# Patient Record
Sex: FEMALE | Race: WHITE | NOT HISPANIC OR LATINO | Employment: OTHER | URBAN - METROPOLITAN AREA
[De-identification: names, ages, dates, MRNs, and addresses within clinical notes are randomized per-mention and may not be internally consistent; named-entity substitution may affect disease eponyms.]

---

## 2022-03-18 PROBLEM — E87.6 HYPOKALEMIA: Status: ACTIVE | Noted: 2020-04-22

## 2022-03-18 PROBLEM — Z86.19 HISTORY OF CRYPTOCOCCAL MENINGITIS: Status: ACTIVE | Noted: 2018-12-06

## 2022-03-18 PROBLEM — Z85.3 HISTORY OF BREAST CANCER: Status: ACTIVE | Noted: 2018-01-25

## 2022-03-19 PROBLEM — L98.9 LESION OF LOWER EXTREMITY: Status: ACTIVE | Noted: 2019-08-09

## 2022-03-19 PROBLEM — F32.0 MDD (MAJOR DEPRESSIVE DISORDER), SINGLE EPISODE, MILD (HCC): Status: ACTIVE | Noted: 2018-11-15

## 2022-03-19 PROBLEM — Z92.25 PERSONAL HISTORY OF IMMUNOSUPRESSION THERAPY: Status: ACTIVE | Noted: 2018-08-20

## 2022-03-19 PROBLEM — Z86.73 HISTORY OF LACUNAR CEREBROVASCULAR ACCIDENT: Status: ACTIVE | Noted: 2018-12-06

## 2022-03-20 PROBLEM — N25.81 SECONDARY HYPERPARATHYROIDISM OF RENAL ORIGIN (HCC): Status: ACTIVE | Noted: 2018-03-15

## 2022-03-20 PROBLEM — R55 SYNCOPE: Status: ACTIVE | Noted: 2020-04-22

## 2022-03-20 PROBLEM — I50.32 DIASTOLIC CHF, CHRONIC (HCC): Status: ACTIVE | Noted: 2018-09-10

## 2022-03-20 PROBLEM — I48.91 ATRIAL FIBRILLATION (HCC): Status: ACTIVE | Noted: 2018-08-31

## 2023-05-12 RX ORDER — LEVALBUTEROL TARTRATE 45 UG/1
AEROSOL, METERED ORAL
COMMUNITY
Start: 2019-03-04

## 2023-05-12 RX ORDER — DULOXETIN HYDROCHLORIDE 60 MG/1
60 CAPSULE, DELAYED RELEASE ORAL DAILY
COMMUNITY

## 2023-05-12 RX ORDER — ASCORBIC ACID 500 MG
TABLET ORAL 2 TIMES DAILY
COMMUNITY
Start: 2018-09-04

## 2023-05-12 RX ORDER — BACLOFEN 10 MG/1
TABLET ORAL PRN
COMMUNITY
Start: 2020-02-25

## 2023-05-12 RX ORDER — METRONIDAZOLE 10 MG/G
GEL TOPICAL
COMMUNITY
Start: 2019-10-07

## 2023-05-12 RX ORDER — PROPRANOLOL HYDROCHLORIDE 10 MG/1
TABLET ORAL 3 TIMES DAILY PRN
COMMUNITY
Start: 2019-12-17

## 2023-05-12 RX ORDER — CETIRIZINE HYDROCHLORIDE 10 MG/1
TABLET ORAL
COMMUNITY
Start: 2014-01-27

## 2023-05-12 RX ORDER — CYCLOSPORINE 0.5 MG/ML
EMULSION OPHTHALMIC
COMMUNITY
Start: 2019-10-25

## 2023-05-12 RX ORDER — ONDANSETRON 4 MG/1
TABLET, ORALLY DISINTEGRATING ORAL EVERY 8 HOURS PRN
COMMUNITY
Start: 2018-11-15

## 2023-05-12 RX ORDER — PRAVASTATIN SODIUM 80 MG/1
1 TABLET ORAL NIGHTLY
COMMUNITY
Start: 2019-10-03

## 2023-05-12 RX ORDER — OLMESARTAN MEDOXOMIL 20 MG/1
TABLET ORAL DAILY
COMMUNITY
Start: 2020-02-25

## 2023-05-12 RX ORDER — DIPHENOXYLATE HYDROCHLORIDE AND ATROPINE SULFATE 2.5; .025 MG/1; MG/1
1 TABLET ORAL
COMMUNITY
Start: 2020-04-24

## 2023-05-12 RX ORDER — METOPROLOL SUCCINATE 100 MG/1
1 TABLET, EXTENDED RELEASE ORAL DAILY
COMMUNITY
Start: 2019-11-20

## 2023-05-12 RX ORDER — ICOSAPENT ETHYL 500 MG/1
2 CAPSULE ORAL 2 TIMES DAILY
COMMUNITY
Start: 2020-01-30

## 2023-05-12 RX ORDER — FLUTICASONE PROPIONATE 50 MCG
SPRAY, SUSPENSION (ML) NASAL
COMMUNITY
Start: 2015-04-10

## 2023-05-12 RX ORDER — INSULIN LISPRO 100 [IU]/ML
INJECTION, SOLUTION INTRAVENOUS; SUBCUTANEOUS
COMMUNITY
Start: 2020-01-16

## 2023-05-12 RX ORDER — LEFLUNOMIDE 10 MG/1
10 TABLET ORAL DAILY
COMMUNITY

## 2023-05-12 RX ORDER — PANTOPRAZOLE SODIUM 40 MG/1
TABLET, DELAYED RELEASE ORAL DAILY
COMMUNITY
Start: 2020-04-29

## 2023-05-12 RX ORDER — INSULIN GLARGINE 100 [IU]/ML
INJECTION, SOLUTION SUBCUTANEOUS
COMMUNITY
Start: 2019-12-30

## 2023-05-12 RX ORDER — FUROSEMIDE 20 MG/1
1 TABLET ORAL DAILY
COMMUNITY
Start: 2019-11-27

## 2023-05-12 RX ORDER — MAGNESIUM CHLORIDE 64 MG
TABLET, DELAYED RELEASE (ENTERIC COATED) ORAL 2 TIMES DAILY
COMMUNITY
Start: 2018-12-27

## 2023-05-12 RX ORDER — FOLIC ACID 1 MG/1
TABLET ORAL DAILY
COMMUNITY
Start: 2014-01-27

## 2024-05-26 PROBLEM — M06.9 RHEUMATOID ARTHRITIS: Status: ACTIVE | Noted: 2024-05-26

## 2024-05-26 PROBLEM — M05.79 RHEUMATOID ARTHRITIS WITH RHEUMATOID FACTOR OF MULTIPLE SITES WITHOUT ORGAN OR SYSTEMS INVOLVEMENT: Status: ACTIVE | Noted: 2024-05-26

## 2024-05-28 ENCOUNTER — OFFICE VISIT (OUTPATIENT)
Age: 79
End: 2024-05-28
Payer: MEDICARE

## 2024-05-28 VITALS
TEMPERATURE: 97.8 F | SYSTOLIC BLOOD PRESSURE: 158 MMHG | WEIGHT: 158.5 LBS | BODY MASS INDEX: 31.95 KG/M2 | HEIGHT: 59 IN | DIASTOLIC BLOOD PRESSURE: 86 MMHG | HEART RATE: 71 BPM

## 2024-05-28 DIAGNOSIS — Z86.61 PERSONAL HISTORY OF MENINGITIS: ICD-10-CM

## 2024-05-28 DIAGNOSIS — M15.9 GENERALIZED OSTEOARTHROSIS, INVOLVING MULTIPLE SITES: ICD-10-CM

## 2024-05-28 DIAGNOSIS — M05.79 RHEUMATOID ARTHRITIS INVOLVING MULTIPLE SITES WITH POSITIVE RHEUMATOID FACTOR: Primary | ICD-10-CM

## 2024-05-28 DIAGNOSIS — D84.821 IMMUNOSUPPRESSION DUE TO DRUG THERAPY: ICD-10-CM

## 2024-05-28 DIAGNOSIS — K29.70 GASTRITIS WITHOUT BLEEDING, UNSPECIFIED CHRONICITY, UNSPECIFIED GASTRITIS TYPE: ICD-10-CM

## 2024-05-28 DIAGNOSIS — Z79.899 HIGH RISK MEDICATION USE: ICD-10-CM

## 2024-05-28 DIAGNOSIS — Z85.3 HISTORY OF BREAST CANCER: ICD-10-CM

## 2024-05-28 DIAGNOSIS — E10.9 TYPE 1 DIABETES MELLITUS WITHOUT COMPLICATION: ICD-10-CM

## 2024-05-28 DIAGNOSIS — Z79.899 IMMUNOSUPPRESSION DUE TO DRUG THERAPY: ICD-10-CM

## 2024-05-28 DIAGNOSIS — M85.89 OSTEOPENIA OF MULTIPLE SITES: ICD-10-CM

## 2024-05-28 PROCEDURE — 99214 OFFICE O/P EST MOD 30 MIN: CPT | Performed by: INTERNAL MEDICINE

## 2024-05-28 PROCEDURE — 1159F MED LIST DOCD IN RCRD: CPT | Performed by: INTERNAL MEDICINE

## 2024-05-28 PROCEDURE — G2211 COMPLEX E/M VISIT ADD ON: HCPCS | Performed by: INTERNAL MEDICINE

## 2024-05-28 PROCEDURE — 1160F RVW MEDS BY RX/DR IN RCRD: CPT | Performed by: INTERNAL MEDICINE

## 2024-05-28 RX ORDER — METOPROLOL SUCCINATE 100 MG/1
100 TABLET, EXTENDED RELEASE ORAL DAILY
COMMUNITY

## 2024-05-28 NOTE — PROGRESS NOTES
Office Follow Up      Date: 05/28/2024   Patient Name: Clarissa Gutierres V  MRN: [id number]  YOB: 1945    Referring Physician: Shameka Cagle     Chief Complaint:   Chief Complaint   Patient presents with    Rheumatoid Arthritis       History of Present Illness: Clarissa Gutierres V is a 78 y.o. female who is here today for follow up on rheumatoid arthritis.    She is tolerating leflunomide and methotrexate well and believes it is very helpful for her rheumatoid arthritis.    Rheumatoid is overall improved.  No joint swelling today.  Some aching but no swelling MCP hands and 5th toe.    Continues with intermittent knee pain. 9/2023 XR with mild degenerative changes.   She can't take NSAIDS due to anticoagulation and gastritis.   She has Voltaren gel she rarely uses. She continues on duloxetine which is helpful for joints.  Intolerant sulfasalazine due to rash/sulfa allergy so she stopped it.  No recent serious infections.  Hand films 9/2023 with mild to moderate OA. No erosions seen.    Patient had episode of meningitis August 2018 requiring hospitalization.  She was advised to stop  Xeljanz and never take them again by her infectious disease Dr. Chang and Dr Alexis Justice ([facility]).    She was hospitalized in [place] in late April 2023 due to gastritis and low hemoglobin 7. She had EGD with biopsy results showing gastritis.  No bleeding ulcer found.  She was placed on Protonix by her gastroenterologist Dr. Cortes.     Subjective       Review of Systems: Review of Systems   Constitutional:  Positive for fatigue. Negative for chills, fever and unexpected weight loss.   HENT:  Negative for mouth sores, sinus pressure and sore throat.         Dry mouth  Nose sores   Eyes:  Negative for pain and redness.        Dry eyes   Respiratory:  Negative for cough and shortness of breath.    Cardiovascular:  Negative for chest pain.   Gastrointestinal:   Positive for nausea and vomiting. Negative for abdominal pain, blood in stool, diarrhea and GERD.   Endocrine: Negative for polydipsia and polyuria.   Genitourinary:  Negative for dysuria, genital sores and hematuria.   Musculoskeletal:  Positive for arthralgias, neck pain and neck stiffness. Negative for back pain, joint swelling and myalgias.   Skin:  Negative for rash and bruise.        Psoriasis  Photosensitvity  Malar rash   Allergic/Immunologic: Negative for immunocompromised state.   Neurological:  Negative for seizures, weakness, numbness and memory problem.   Hematological:  Negative for adenopathy. Does not bruise/bleed easily.   Psychiatric/Behavioral:  Negative for depressed mood. The patient is not nervous/anxious.         Medications:   Current Outpatient Medications:     atorvastatin (LIPITOR) 40 MG tablet, Take 1 tablet by mouth Daily., Disp: , Rfl:     baclofen (LIORESAL) 10 MG tablet, Take 1 tablet by mouth 3 (Three) Times a Day., Disp: , Rfl:     Cholecalciferol (Vitamin D3) 50 MCG capsule, , Disp: , Rfl:     cycloSPORINE (RESTASIS) 0.05 % ophthalmic emulsion, , Disp: , Rfl:     Cymbalta 60 MG capsule, Take 1 capsule by mouth Daily., Disp: , Rfl:     DULoxetine (CYMBALTA) 30 MG capsule, Take 1 capsule by mouth Daily., Disp: , Rfl:     famotidine (PEPCID) 40 MG tablet, Take 1 tablet by mouth Every Night., Disp: , Rfl:     ferrous sulfate 325 (65 Fe) MG tablet, Take 1 tablet by mouth Daily., Disp: , Rfl:     fluticasone (FLONASE) 50 MCG/ACT nasal spray, 2 sprays by Each Nare route Daily. 50mcg, Disp: , Rfl:     folic acid (FOLVITE) 1 MG tablet, Take 1 tablet by mouth Daily., Disp: , Rfl:     furosemide (LASIX) 20 MG tablet, Take 1 tablet by mouth Daily., Disp: , Rfl:     insulin glargine (LANTUS, SEMGLEE) 100 UNIT/ML injection, Inject by subcutaneous route as per insulin protocol, Disp: , Rfl:     Insulin Lispro (humaLOG) 100 UNIT/ML injection, , Disp: , Rfl:     Insulin Syringe-Needle U-100 (BD  "Insulin Syringe U/F) 31G X 5/16\" 1 ML misc, Use., Disp: , Rfl:     ipratropium (ATROVENT) 0.03 % nasal spray, , Disp: , Rfl:     leflunomide (ARAVA) 20 MG tablet, Take 1 tablet by mouth Daily. For arthritis, Disp: , Rfl:     levalbuterol (XOPENEX HFA) 45 MCG/ACT inhaler, Inhale 2 puffs Every 4 (Four) Hours., Disp: , Rfl:     methotrexate 2.5 MG tablet, Take 3 tablets by mouth 1 (One) Time Per Week., Disp: , Rfl:     metoprolol succinate XL (TOPROL-XL) 100 MG 24 hr tablet, Take 1 tablet by mouth Daily., Disp: , Rfl:     multivitamin (MULTIPLE VITAMINS PO), Take 1 tablet by mouth Daily., Disp: , Rfl:     olmesartan (BENICAR) 20 MG tablet, Take 1 tablet by mouth Daily., Disp: , Rfl:     pantoprazole (PROTONIX) 40 MG EC tablet, Take 1 tablet by mouth Daily., Disp: , Rfl:     potassium chloride (KLOR-CON M20) 20 MEQ CR tablet, Take 1 tablet by mouth. Three times weekly, Disp: , Rfl:     promethazine (PHENERGAN) 25 MG tablet, Take 1 tablet by mouth Every 4 (Four) to 6 (Six) Hours As Needed for Nausea or Vomiting., Disp: , Rfl:     rivaroxaban (XARELTO) 15 MG tablet, , Disp: , Rfl:     Allergies:   Allergies   Allergen Reactions    Sulfa Antibiotics Rash       I have reviewed and updated the patient's chief complaint, history of present illness, review of systems, past medical history, surgical history, family history, social history, medications and allergy list as appropriate.     Objective        Vital Signs:   Vitals:    05/28/24 1122   BP: 158/86   BP Location: Left arm   Patient Position: Sitting   Cuff Size: Adult   Pulse: 71   Temp: 97.8 °F (36.6 °C)   Weight: 71.9 kg (158 lb 8 oz)   Height: 149.9 cm (59\")   PainSc:   6     Body mass index is 32.01 kg/m².      Physical Exam:  Physical Exam   MUSCULOSKELETAL:   No peripheral synovitis  Tender bilateral fifth MCP joint hand without swelling  Tender fifth MTP joint left foot without swelling    Complete joint exam was performed including the MCPs, PIPs, DIPs of the " "hands, wrists, elbows, shoulders, hips, knees and ankles.  No soft tissue swelling or tenderness is present except as above.    General: The patient is well-developed and well nourished. Cooperative, alert and oriented. Affect is normal. Hydration appears normal.   HEENT: Normocephalic and atraumatic. No notable alopecia. Lids and conjunctiva are normal. Pupils are equal and sclera are clear. Oropharynx is clear   NECK neck is supple without adenopathy, masses or thyromegaly.   CARDIOVASCULAR: Regular rate and rhythm. No murmurs, rubs or gallops   LUNGS: Effort is normal. Lungs are clear bilateral   ABDOMEN: Not examined  EXTREMITIES: Peripheral pulses are intact. No clubbing.   SKIN: No rashes. No subcutaneous nodules. No digital ulcers. No sclerodactyly.   NEUROLOGIC: Gait is normal. Strength testing is normal.  No focal neurologic deficits    Results Review:   Labs:   Lab Results   Component Value Date    BUN 26 05/23/2024    CREATININE 1.5 (H) 05/23/2024    BCR 17 05/23/2024    K 3.8 05/23/2024    CO2 23 05/23/2024    CALCIUM 9.0 05/23/2024    ALBUMIN 3.9 05/23/2024    BILITOT 1.0 05/23/2024    AST 20 05/23/2024    ALT 19 05/23/2024     Lab Results   Component Value Date    WBC 6.1 05/23/2024    HGB 10.8 (L) 05/23/2024    HCT 33.2 05/23/2024    MCV 88.6 05/23/2024     05/23/2024     Lab Results   Component Value Date    SEDRATE 55 (H) 12/04/2019     Lab Results   Component Value Date    CRP <0.29 12/04/2019     No results found for: \"QUANTIFERO\", \"QUANTITB1\", \"QUANTITB2\", \"QUANTIFERN\", \"QUANTIFERM\", \"QUANTITBGLDP\"  No results found for: \"RF\"  No results found for: \"HEPBSAG\", \"HEPAIGM\", \"HEPBIGMCORE\", \"HEPCVIRUSABY\"      Procedures    Assessment / Plan        Assessment & Plan  Rheumatoid arthritis involving multiple sites with positive rheumatoid factor  dx 2009 dr morales; +; 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  Rash with sulfasalazine/sulfa allergic  prior plaquenil [id number] (stopped [facility] 8.15)  prior xeljanz 11.15 -stopped with meningitis August 2018  Told by ID never to take biologics again  Hand films 9/2023 with mild-moderate OA and no erosions seen.      Prior Methotrexate and Xeljanz stopped in August 2018 due to episode of meningitis requiring hospitalization.    Her ID doctor recommend she never take biologics/JAKs again  Intolerant sulfasalazine due to rash/sulfa allergy. Remain off sulfasalazine.    Some hand pain likely related to OA- hand films 9/2023 with mild-moderate OA and no erosions seen  Avoids oral NSAIDs with anticoagulation.  Continue duloxetine, topical diclofenac for OA pain    Low disease activity from RA.  Swollen joint count 0 tender joint count 2. Good prognosis  Continue methotrexate and leflunomide.   Will avoid further Biologics/JAKis for RA given her history of meningitis August 2018  Continue labs CBC CMP every 8 weeks for monitoring. Standing order provided.    Labs reviewed and stable   rtc 4 months  Immunosuppression due to drug therapy  Methotrexate and leflunomide    Well tolerated and effective  We have discussed the side effects of leflunomide including but not limited to rash, GI upset, hematologic and liver abnormalities     We discussed DMARD therapy including Plaquenil, Sulfasalazine,  Leflunomide, and MTX and the alternative of not being treated. MTX was chosen. Risks include but are not limited to severe liver damage that can be fatal, the possible need for liver biopsy, bone marrow suppression that can lead to dangerously low blood counts, GI side effects including mouth sores and diarrhea, fatigue, and rare risk of severe pulmonary complications. There should be no alcohol consumed with MTX. MTX can cause severe fetal abnormalities whether the mother or father is taking the medication and thus  must be avoided if pregnancy is a possibility.  All medication is to be taken one day a week only. The need for q 8-12 week labs and the need for folic acid supplementation were discussed  High risk medication use    Generalized osteoarthrosis, involving multiple sites  2019 XRs severe PF OA  2023 XRs with mild degenerative change.    Avoids oral NSAIDs with anticoagulation  Some OA pain hands and feet today  Can schedule injections PRN.   She wants to try Voltaren gel more regularly first  Recommend increase duloxetine to 30 mg in AM and 60 mg in PM  Osteopenia of multiple sites  DEXA performed 1/18/2018 : stable mild osteopenia T score -1.1  DXA with PCP 2020  Prior Fosamax  History of radiation for breast cancer    She states her PCP completed a DEXA in 2020 because she couldn't come to [place] for her testing in our office due to COVID.   Continue calcium vitamin D and weightbearing exercise  Now following with PCP    Personal history of meningitis  August 2018 requiring hospitalization.   Previous Infectious disease in [facility] Dr. Alexis Vela    History of 2018. She's recovered well from this.  No recurrence  Still some memory deficits but overall doing much better    Type 1 diabetes mellitus without complication    History of breast cancer  hx of; sp radiation 2001.  Gastritis without bleeding, unspecified chronicity, unspecified gastritis type  GI in [place] Dr. Cortes    Orders Placed This Encounter   Procedures    BUN+Creat ([facility])    CBC Auto Differential    C-reactive Protein    Hepatic Function Panel    Sedimentation Rate              Follow Up:   Return in about 4 months (around 9/28/2024).        Kamlesh Washburn MD  [facility] [place]

## 2024-05-28 NOTE — ASSESSMENT & PLAN NOTE
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  Rash with sulfasalazine/sulfa allergic  prior plaquenil [id number] ([facility]  prior xeljanz 11.15 -stopped with meningitis August 2018  Told by ID never to take biologics again  Hand films 9/2023 with mild-moderate OA and no erosions seen.      Prior Methotrexate and Xeljanz stopped in August 2018 due to episode of meningitis requiring hospitalization.    Her ID doctor recommend she never take biologics/JAKs again  Intolerant sulfasalazine due to rash/sulfa allergy. Remain off sulfasalazine.    Some hand pain likely related to OA- hand films 9/2023 with mild-moderate OA and no erosions seen  Avoids oral NSAIDs with anticoagulation.  Continue duloxetine, topical diclofenac for OA pain    Low disease activity from RA.  Swollen joint count 0 tender joint count 2. Good prognosis  Continue methotrexate and leflunomide.   Will avoid further Biologics/JAKis for RA given her history of meningitis August 2018  Continue labs CBC CMP every 8 weeks for monitoring. Standing order provided.    Labs reviewed and stable   rtc 4 months

## 2024-05-28 NOTE — ASSESSMENT & PLAN NOTE
Methotrexate and leflunomide    Well tolerated and effective  We have discussed the side effects of leflunomide including but not limited to rash, GI upset, hematologic and liver abnormalities     We discussed DMARD therapy including Plaquenil, Sulfasalazine,  Leflunomide, and MTX and the alternative of not being treated. MTX was chosen. Risks include but are not limited to severe liver damage that can be fatal, the possible need for liver biopsy, bone marrow suppression that can lead to dangerously low blood counts, GI side effects including mouth sores and diarrhea, fatigue, and rare risk of severe pulmonary complications. There should be no alcohol consumed with MTX. MTX can cause severe fetal abnormalities whether the mother or father is taking the medication and thus must be avoided if pregnancy is a possibility.  All medication is to be taken one day a week only. The need for q 8-12 week labs and the need for folic acid supplementation were discussed

## 2024-06-21 RX ORDER — METHOTREXATE 2.5 MG/1
TABLET ORAL
Qty: 36 TABLET | Refills: 3 | Status: SHIPPED | OUTPATIENT
Start: 2024-06-21

## 2024-07-05 RX ORDER — LEFLUNOMIDE 20 MG/1
TABLET ORAL
Qty: 90 TABLET | Refills: 0 | Status: SHIPPED | OUTPATIENT
Start: 2024-07-05

## 2024-09-05 RX ORDER — DULOXETIN HYDROCHLORIDE 30 MG/1
CAPSULE, DELAYED RELEASE ORAL
Qty: 90 CAPSULE | Refills: 1 | Status: SHIPPED | OUTPATIENT
Start: 2024-09-05

## 2024-10-02 RX ORDER — LEFLUNOMIDE 20 MG/1
TABLET ORAL
Qty: 90 TABLET | Refills: 0 | Status: SHIPPED | OUTPATIENT
Start: 2024-10-02 | End: 2024-10-03

## 2024-10-03 ENCOUNTER — OFFICE VISIT (OUTPATIENT)
Age: 79
End: 2024-10-03
Payer: MEDICARE

## 2024-10-03 ENCOUNTER — TELEPHONE (OUTPATIENT)
Age: 79
End: 2024-10-03

## 2024-10-03 VITALS
BODY MASS INDEX: 32.66 KG/M2 | TEMPERATURE: 97.7 F | SYSTOLIC BLOOD PRESSURE: 152 MMHG | DIASTOLIC BLOOD PRESSURE: 88 MMHG | WEIGHT: 162 LBS | HEART RATE: 61 BPM | HEIGHT: 59 IN

## 2024-10-03 DIAGNOSIS — M05.79 RHEUMATOID ARTHRITIS INVOLVING MULTIPLE SITES WITH POSITIVE RHEUMATOID FACTOR: Primary | ICD-10-CM

## 2024-10-03 DIAGNOSIS — Z79.899 HIGH RISK MEDICATION USE: ICD-10-CM

## 2024-10-03 RX ORDER — METHOTREXATE 2.5 MG/1
12.5 TABLET ORAL WEEKLY
Qty: 60 TABLET | Refills: 0 | Status: SHIPPED | OUTPATIENT
Start: 2024-10-03

## 2024-10-03 RX ORDER — DULOXETINE HCL 60 MG
60 CAPSULE,DELAYED RELEASE (ENTERIC COATED) ORAL DAILY
Qty: 90 CAPSULE | Refills: 1 | Status: SHIPPED | OUTPATIENT
Start: 2024-10-03

## 2024-10-03 RX ORDER — ALBUTEROL SULFATE 90 UG/1
INHALANT RESPIRATORY (INHALATION)
COMMUNITY
Start: 2024-09-04

## 2024-10-03 RX ORDER — FOLIC ACID 1 MG/1
1 TABLET ORAL DAILY
Qty: 90 TABLET | Refills: 3 | Status: SHIPPED | OUTPATIENT
Start: 2024-10-03

## 2024-10-03 RX ORDER — DULOXETIN HYDROCHLORIDE 30 MG/1
30 CAPSULE, DELAYED RELEASE ORAL DAILY
Qty: 90 CAPSULE | Refills: 1 | Status: SHIPPED | OUTPATIENT
Start: 2024-10-03

## 2024-10-03 RX ORDER — LEFLUNOMIDE 20 MG/1
20 TABLET ORAL DAILY
Qty: 90 TABLET | Refills: 0 | Status: SHIPPED | OUTPATIENT
Start: 2024-10-03

## 2024-10-03 NOTE — PROGRESS NOTES
Office Follow Up      Date: 10/03/2024   Patient Name: Clarissa Gutierres V  MRN: [id number]  YOB: 1945    Referring Physician: No ref. provider found     Chief Complaint: RA  Chief Complaint   Patient presents with    Follow-up       History of Present Illness: Clarissa Gutierres V is a 78 y.o. female who is here today for follow up on rheumatoid arthritis.    She is tolerating leflunomide and methotrexate well and believes they are helpful for her rheumatoid arthritis.    Rheumatoid is overall improved.  No joint swelling today.  Some aching hands PIPs/DIP joints and feet MTP joint but no swelling     Continues with intermittent knee pain. 9/2023 XR with mild degenerative changes.   She avoids oral NSAIDS due to anticoagulation and gastritis.   She has Voltaren gel she rarely uses. She continues on duloxetine which is helpful for pain  Intolerant sulfasalazine due to rash/sulfa allergy so she stopped it.  No recent serious infections.  Hand films 9/2023 with mild to moderate OA. No erosions seen.    Patient had episode of meningitis August 2018 requiring hospitalization.  She was advised to stop    Xeljanz and never take José Antonio inhibitor again by her infectious disease Dr. Chang and Dr Alexis Justice ([facility] in [place])    She was hospitalized in [place] in late April 2023 due to gastritis and low hemoglobin 7. She had EGD with biopsy results showing gastritis.  No bleeding ulcer found.  She was placed on Protonix by her gastroenterologist Dr. Cortes.       Subjective       Review of Systems: Review of Systems   Constitutional:  Positive for fatigue. Negative for chills, fever and unexpected weight loss.   HENT:  Negative for mouth sores, sinus pressure and sore throat.         Dry mouth  Nose sores   Eyes:  Negative for pain and redness.        Dry eyes   Respiratory:  Negative for cough and shortness of breath.    Cardiovascular:  Positive for chest pain.    Gastrointestinal:  Positive for nausea and vomiting. Negative for abdominal pain, blood in stool, diarrhea and GERD.   Endocrine: Negative for polydipsia and polyuria.   Genitourinary:  Negative for dysuria, genital sores and hematuria.   Musculoskeletal:  Positive for arthralgias, neck pain and neck stiffness. Negative for back pain, joint swelling and myalgias.   Skin:  Negative for rash and bruise.        Psoriasis  Photosensitvity  Malar rash   Allergic/Immunologic: Negative for immunocompromised state.   Neurological:  Negative for seizures, weakness, numbness and memory problem.   Hematological:  Negative for adenopathy. Does not bruise/bleed easily.   Psychiatric/Behavioral:  Negative for depressed mood. The patient is not nervous/anxious.         Medications:   Current Outpatient Medications:     albuterol sulfate  (90 Base) MCG/ACT inhaler, , Disp: , Rfl:     atorvastatin (LIPITOR) 40 MG tablet, Take 1 tablet by mouth Daily., Disp: , Rfl:     baclofen (LIORESAL) 10 MG tablet, Take 1 tablet by mouth 3 (Three) Times a Day., Disp: , Rfl:     Cholecalciferol (Vitamin D3) 50 MCG capsule, , Disp: , Rfl:     cycloSPORINE (RESTASIS) 0.05 % ophthalmic emulsion, , Disp: , Rfl:     Cymbalta 60 MG capsule, Take 1 capsule by mouth Daily., Disp: 90 capsule, Rfl: 1    DULoxetine (CYMBALTA) 30 MG capsule, Take 1 capsule by mouth Daily., Disp: 90 capsule, Rfl: 1    famotidine (PEPCID) 40 MG tablet, Take 1 tablet by mouth Every Night., Disp: , Rfl:     ferrous sulfate 325 (65 Fe) MG tablet, Take 1 tablet by mouth Daily., Disp: , Rfl:     fluticasone (FLONASE) 50 MCG/ACT nasal spray, 2 sprays by Each Nare route Daily. 50mcg, Disp: , Rfl:     folic acid (FOLVITE) 1 MG tablet, Take 1 tablet by mouth Daily., Disp: 90 tablet, Rfl: 3    furosemide (LASIX) 20 MG tablet, Take 1 tablet by mouth Daily., Disp: , Rfl:     insulin glargine (LANTUS, SEMGLEE) 100 UNIT/ML injection, Inject by subcutaneous route as per insulin  "protocol, Disp: , Rfl:     Insulin Lispro (humaLOG) 100 UNIT/ML injection, , Disp: , Rfl:     Insulin Syringe-Needle U-100 (BD Insulin Syringe U/F) 31G X 5/16\" 1 ML misc, Use., Disp: , Rfl:     ipratropium (ATROVENT) 0.03 % nasal spray, , Disp: , Rfl:     leflunomide (ARAVA) 20 MG tablet, Take 1 tablet by mouth Daily., Disp: 90 tablet, Rfl: 0    levalbuterol (XOPENEX HFA) 45 MCG/ACT inhaler, Inhale 2 puffs Every 4 (Four) Hours., Disp: , Rfl:     methotrexate 2.5 MG tablet, Take 5 tablets by mouth 1 (One) Time Per Week., Disp: 60 tablet, Rfl: 0    metoprolol succinate XL (TOPROL-XL) 100 MG 24 hr tablet, Take 1 tablet by mouth Daily., Disp: , Rfl:     multivitamin (MULTIPLE VITAMINS PO), Take 1 tablet by mouth Daily., Disp: , Rfl:     olmesartan (BENICAR) 20 MG tablet, Take 1 tablet by mouth Daily., Disp: , Rfl:     potassium chloride (KLOR-CON M20) 20 MEQ CR tablet, Take 1 tablet by mouth. Three times weekly, Disp: , Rfl:     rivaroxaban (XARELTO) 15 MG tablet, , Disp: , Rfl:     Allergies:   Allergies   Allergen Reactions    Sulfa Antibiotics Rash       I have reviewed and updated the patient's chief complaint, history of present illness, review of systems, past medical history, surgical history, family history, social history, medications and allergy list as appropriate.     Objective        Vital Signs:   Vitals:    10/03/24 1021   BP: 152/88   BP Location: Left arm   Patient Position: Sitting   Cuff Size: Adult   Pulse: 61   Temp: 97.7 °F (36.5 °C)   Weight: 73.5 kg (162 lb)   Height: 149.9 cm (59\")   PainSc:   4   PainLoc: Generalized       Body mass index is 32.72 kg/m².      Physical Exam:  Physical Exam   MUSCULOSKELETAL:   No peripheral synovitis  Tegan's nodes present throughout the hands.  Scattered tenderness DIP joints  Tender MTP joints bilateral feet    Complete joint exam was performed including the MCPs, PIPs, DIPs of the hands, wrists, elbows, shoulders, hips, knees and ankles.  No soft tissue " "swelling or tenderness is present except as above.    General: The patient is well-developed and well nourished. Cooperative, alert and oriented. Affect is normal. Hydration appears normal.   HEENT: Normocephalic and atraumatic. No notable alopecia. Lids and conjunctiva are normal. Pupils are equal and sclera are clear. Oropharynx is clear   NECK neck is supple without adenopathy, masses or thyromegaly.   CARDIOVASCULAR: Regular rate and rhythm. No murmurs, rubs or gallops   LUNGS: Effort is normal. Lungs are clear bilateral   ABDOMEN: Not examined  EXTREMITIES: Peripheral pulses are intact. No clubbing.   SKIN: No rashes. No subcutaneous nodules. No digital ulcers. No sclerodactyly.   NEUROLOGIC: Gait is normal. Strength testing is normal.  No focal neurologic deficits    Results Review:   Labs:   Lab Results   Component Value Date    BUN 21 09/26/2024    BUN 21 09/26/2024    CREATININE 1.2 (H) 09/26/2024    CREATININE 1.2 (H) 09/26/2024    BCR 18 09/26/2024    K 4.6 09/26/2024    CO2 25 09/26/2024    CALCIUM 8.3 (L) 09/26/2024    ALBUMIN 3.8 09/26/2024    ALBUMIN 3.7 09/26/2024    BILITOT 0.7 09/26/2024    BILITOT 0.7 09/26/2024    AST 25 09/26/2024    AST 24 09/26/2024    ALT 24 09/26/2024    ALT 23 09/26/2024     Lab Results   Component Value Date    WBC 5.3 09/26/2024    WBC 5.2 09/26/2024    HGB 11.3 (L) 09/26/2024    HGB 10.8 (L) 09/26/2024    HCT 34.1 09/26/2024    HCT 34.0 09/26/2024    MCV 87.1 09/26/2024    MCV 87.0 09/26/2024     09/26/2024     09/26/2024     Lab Results   Component Value Date    SEDRATE 55 (H) 12/04/2019     Lab Results   Component Value Date    CRP <0.29 12/04/2019     No results found for: \"QUANTIFERO\", \"QUANTITB1\", \"QUANTITB2\", \"QUANTIFERN\", \"QUANTIFERM\", \"QUANTITBGLDP\"  No results found for: \"RF\"  No results found for: \"HEPBSAG\", \"HEPAIGM\", \"HEPBIGMCORE\", \"HEPCVIRUSABY\"      Procedures    Assessment / Plan        Rheumatoid arthritis involving multiple sites with " positive rheumatoid factor  dx 2009 dr morales; +; 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  Rash with sulfasalazine/sulfa allergic  prior plaquenil [id number] (stopped [facility] 8.15)  prior xeljanz 11.15 -stopped with meningitis August 2018  Told by ID never to take biologics again  Hand films 9/2023 with mild-moderate OA and no erosions seen.        Prior Methotrexate and Xeljanz stopped in August 2018 due to episode of meningitis requiring hospitalization.    Her ID doctor recommend she never take biologics/JAKs again  Intolerant sulfasalazine due to rash/sulfa allergy. Remain off sulfasalazine.     Some hand pain likely related to OA- hand films 9/2023 with mild-moderate OA and no erosions seen  Avoids oral NSAIDs with anticoagulation.    Continue duloxetine 90mg, topical diclofenac for OA pain     Low disease activity from RA.  Swollen joint count 0 tender joint count 2. Good prognosis  Increased aching in the hands and MTP joints of the feet could be smoldering RA.  -Update hand and feet x-rays today  Increase methotrexate to 12.5 mg qwk and continue leflunomide 20mg qd.   Will avoid further Biologics/JAKis for RA given her history of meningitis August 2018  Continue labs CBC CMP every 8 weeks for monitoring. Standing order provided.    Labs 9/26/2024 reviewed and stable   rtc 4 months    Immunosuppression due to drug therapy  Methotrexate and leflunomide     Well tolerated and effective  We have discussed the side effects of leflunomide including but not limited to rash, GI upset, hematologic and liver abnormalities     Risks of methotrexate include but are not limited to severe liver damage that can be fatal, the possible need for liver biopsy, bone marrow suppression that can lead to dangerously low blood counts, GI side effects including  mouth sores and diarrhea, fatigue, and rare risk of severe pulmonary complications. There should be no alcohol consumed with MTX. MTX can cause severe fetal abnormalities whether the mother or father is taking the medication and thus must be avoided if pregnancy is a possibility.  All medication is to be taken one day a week only. The need for q 8-12 week labs and the need for folic acid supplementation were discussed    High risk medication use     Generalized osteoarthrosis, involving multiple sites  2019 XRs severe PF OA  2023 XRs with mild degenerative change.     Avoids oral NSAIDs with anticoagulation/history of GI bleed  Some OA pain hands and feet today  Can schedule injections PRN.   She wants to try diclofenac gel more regularly first  Recommend duloxetine to 30 mg in AM and 60 mg in PM    Osteopenia of multiple sites  DEXA performed 1/18/2018 : stable mild osteopenia T score -1.1  DXA with PCP 2020  Prior Fosamax  History of radiation for breast cancer     She states her PCP completed a DEXA in 2020 because she couldn't come to [place] for her testing in our office due to COVID.   Continue calcium vitamin D and weightbearing exercise  Now following DEXA scan with PCP     Personal history of meningitis  August 2018 requiring hospitalization.   Previous Infectious disease in [facility] Dr. Alexis Vela     History of 2018. She's recovered well from this.  No recurrence  Still some memory deficits but overall doing much better     diabetes mellitus      History of breast cancer  hx of; sp radiation 2001.    Gastritis without bleeding, unspecified chronicity, unspecified gastritis type  GI in [place] Dr. Cortes        Assessment & Plan  Rheumatoid arthritis involving multiple sites with positive rheumatoid factor    High risk medication use      Orders Placed This Encounter   Procedures    XR Hand 2 View Bilateral    XR Foot 3+ View Bilateral    Comprehensive Metabolic Panel    CBC Auto  Differential    C-reactive Protein    Sedimentation Rate       New Medications Ordered This Visit   Medications    DULoxetine (CYMBALTA) 30 MG capsule     Sig: Take 1 capsule by mouth Daily.     Dispense:  90 capsule     Refill:  1    Cymbalta 60 MG capsule     Sig: Take 1 capsule by mouth Daily.     Dispense:  90 capsule     Refill:  1    leflunomide (ARAVA) 20 MG tablet     Sig: Take 1 tablet by mouth Daily.     Dispense:  90 tablet     Refill:  0    methotrexate 2.5 MG tablet     Sig: Take 5 tablets by mouth 1 (One) Time Per Week.     Dispense:  60 tablet     Refill:  0    folic acid (FOLVITE) 1 MG tablet     Sig: Take 1 tablet by mouth Daily.     Dispense:  90 tablet     Refill:  3           Follow Up:   Return in about 4 months (around 2/3/2025).        Kamlesh Washburn MD  [facility]

## 2024-10-08 ENCOUNTER — TELEPHONE (OUTPATIENT)
Age: 79
End: 2024-10-08
Payer: MEDICARE

## 2024-10-08 NOTE — TELEPHONE ENCOUNTER
[facility] called regarding rx for Cymbalta 60 mg. They want to know if it is ok to fill generic duloxetine as brand name is not covered by plan. Advised that rx was sent in [place] by mistake and generic ok to fill.

## 2024-11-19 ENCOUNTER — TELEPHONE (OUTPATIENT)
Age: 79
End: 2024-11-19
Payer: MEDICARE

## 2024-11-19 NOTE — TELEPHONE ENCOUNTER
[facility] called requesting a call back re: pt's Duloxetine.  The number to call back is [phone] and the invoice # to give them when you call is [id number]. -GRAHAM William

## 2024-11-20 NOTE — TELEPHONE ENCOUNTER
Pharmacy states that RX is due to be shipped. Unable to find an issue but will call us back if something comes up

## 2025-01-03 DIAGNOSIS — M05.79 RHEUMATOID ARTHRITIS INVOLVING MULTIPLE SITES WITH POSITIVE RHEUMATOID FACTOR: ICD-10-CM

## 2025-01-07 RX ORDER — METHOTREXATE 2.5 MG/1
TABLET ORAL
Qty: 60 TABLET | Refills: 0 | Status: SHIPPED | OUTPATIENT
Start: 2025-01-07

## 2025-02-07 ENCOUNTER — OFFICE VISIT (OUTPATIENT)
Age: 80
End: 2025-02-07
Payer: MEDICARE

## 2025-02-07 VITALS
HEART RATE: 58 BPM | HEIGHT: 59 IN | BODY MASS INDEX: 32.78 KG/M2 | SYSTOLIC BLOOD PRESSURE: 124 MMHG | TEMPERATURE: 97.3 F | DIASTOLIC BLOOD PRESSURE: 58 MMHG | WEIGHT: 162.6 LBS

## 2025-02-07 DIAGNOSIS — Z79.899 HIGH RISK MEDICATION USE: ICD-10-CM

## 2025-02-07 DIAGNOSIS — M05.79 RHEUMATOID ARTHRITIS INVOLVING MULTIPLE SITES WITH POSITIVE RHEUMATOID FACTOR: Primary | ICD-10-CM

## 2025-02-07 RX ORDER — ALENDRONATE SODIUM 35 MG/1
35 TABLET ORAL
Qty: 12 TABLET | Refills: 3 | Status: SHIPPED | OUTPATIENT
Start: 2025-02-07

## 2025-02-07 RX ORDER — METHOTREXATE 2.5 MG/1
12.5 TABLET ORAL WEEKLY
Qty: 60 TABLET | Refills: 0 | Status: SHIPPED | OUTPATIENT
Start: 2025-02-07

## 2025-02-07 RX ORDER — LEFLUNOMIDE 20 MG/1
20 TABLET ORAL DAILY
Qty: 90 TABLET | Refills: 0 | Status: SHIPPED | OUTPATIENT
Start: 2025-02-07

## 2025-02-07 NOTE — PROGRESS NOTES
Office Follow Up      Date: 02/07/2025   Patient Name: Clarissa Gutierres V  MRN: [id number]  YOB: 1945    Referring Physician: No ref. provider found     Chief Complaint:   Chief Complaint   Patient presents with    Rheumatoid Arthritis       History of Present Illness: Clarissa Gutierres V is a 79 y.o. female who is here today for follow up on rheumatoid arthritis.     She is tolerating leflunomide and methotrexate well    Rheumatoid is overall improved.  No joint swelling today.    Some aching hands PIPs/DIP joints and feet MTP joints from OA but no swelling      Continues with intermittent knee pain. 9/2023 XR with mild degenerative changes.   She avoids oral NSAIDS due to anticoagulation and gastritis and renal insufficiency.   She has Voltaren gel she rarely uses. She continues on duloxetine which is helpful for pain  Previously intolerant to sulfasalazine due to rash/sulfa allergy   No recent serious infections.  Hand films 9/2023 with mild to moderate OA. No erosions seen.     Patient had episode of meningitis August 2018 requiring hospitalization.  She was advised to stop    Xeljanz and never take José Antonio inhibitor again by her infectious disease Dr. Chang and Dr Alexis Justice ([facility] in [place])     She was hospitalized in [place] in late April 2023 due to gastritis and low hemoglobin 7. She had EGD with biopsy results showing gastritis.  No bleeding ulcer found.  She was placed on Protonix by her gastroenterologist Dr. Cortes.       Subjective       Review of Systems: Review of Systems   Constitutional:  Negative for chills, fatigue, fever and unexpected weight loss.   HENT:  Negative for mouth sores, sinus pressure and sore throat.    Eyes:  Negative for pain and redness.   Respiratory:  Negative for cough and shortness of breath.    Cardiovascular:  Negative for chest pain.   Gastrointestinal:  Negative for abdominal pain, blood in stool, diarrhea, nausea,  vomiting and GERD.   Endocrine: Negative for polydipsia and polyuria.   Genitourinary:  Negative for dysuria, genital sores and hematuria.   Musculoskeletal:  Negative for arthralgias, back pain, joint swelling, myalgias, neck pain and neck stiffness.   Skin:  Negative for rash and bruise.   Neurological:  Negative for seizures, weakness, numbness and memory problem.   Hematological:  Negative for adenopathy. Does not bruise/bleed easily.   Psychiatric/Behavioral:  Negative for depressed mood. The patient is not nervous/anxious.         Past Medical History: History reviewed. No pertinent past medical history.    Past Surgical History: History reviewed. No pertinent surgical history.    Family History: History reviewed. No pertinent family history.    Social History:   Social History     Socioeconomic History    Marital status:    Tobacco Use    Smoking status: Never    Smokeless tobacco: Never   Vaping Use    Vaping status: Never Used   Substance and Sexual Activity    Alcohol use: Never    Drug use: Never    Sexual activity: Defer       Medications:   Current Outpatient Medications:     albuterol sulfate  (90 Base) MCG/ACT inhaler, , Disp: , Rfl:     atorvastatin (LIPITOR) 40 MG tablet, Take 1 tablet by mouth Daily., Disp: , Rfl:     baclofen (LIORESAL) 10 MG tablet, Take 1 tablet by mouth 3 (Three) Times a Day., Disp: , Rfl:     cycloSPORINE (RESTASIS) 0.05 % ophthalmic emulsion, , Disp: , Rfl:     Cymbalta 60 MG capsule, Take 1 capsule by mouth Daily., Disp: 90 capsule, Rfl: 1    fluticasone (FLONASE) 50 MCG/ACT nasal spray, 2 sprays by Each Nare route Daily. 50mcg, Disp: , Rfl:     folic acid (FOLVITE) 1 MG tablet, Take 1 tablet by mouth Daily., Disp: 90 tablet, Rfl: 3    furosemide (LASIX) 20 MG tablet, Take 1 tablet by mouth Daily., Disp: , Rfl:     insulin glargine (LANTUS, SEMGLEE) 100 UNIT/ML injection, Inject by subcutaneous route as per insulin protocol, Disp: , Rfl:     Insulin Lispro  "(humaLOG) 100 UNIT/ML injection, , Disp: , Rfl:     Insulin Syringe-Needle U-100 (BD Insulin Syringe U/F) 31G X 5/16\" 1 ML misc, Use., Disp: , Rfl:     ipratropium (ATROVENT) 0.03 % nasal spray, , Disp: , Rfl:     leflunomide (ARAVA) 20 MG tablet, Take 1 tablet by mouth Daily., Disp: 90 tablet, Rfl: 0    levalbuterol (XOPENEX HFA) 45 MCG/ACT inhaler, Inhale 2 puffs Every 4 (Four) Hours., Disp: , Rfl:     methotrexate 2.5 MG tablet, Take 5 tablets by mouth 1 (One) Time Per Week., Disp: 60 tablet, Rfl: 0    metoprolol succinate XL (TOPROL-XL) 100 MG 24 hr tablet, Take 1 tablet by mouth Daily., Disp: , Rfl:     olmesartan (BENICAR) 20 MG tablet, Take 1 tablet by mouth Daily., Disp: , Rfl:     rivaroxaban (XARELTO) 15 MG tablet, , Disp: , Rfl:     alendronate (FOSAMAX) 35 MG tablet, Take 1 tablet by mouth Every 7 (Seven) Days., Disp: 12 tablet, Rfl: 3    Cholecalciferol (Vitamin D3) 50 MCG capsule, , Disp: , Rfl:     Allergies:   Allergies   Allergen Reactions    Sulfa Antibiotics Rash           Objective        Vital Signs:   Vitals:    02/07/25 1006   BP: 124/58   Pulse: 58   Temp: 97.3 °F (36.3 °C)   Weight: 73.8 kg (162 lb 9.6 oz)   Height: 149.9 cm (59\")   PainSc:   3   PainLoc: Generalized     Body mass index is 32.84 kg/m².      Physical Exam:  Physical Exam   MUSCULOSKELETAL:   No peripheral synovitis  Heberden and Tegan's nodes present throughout the hands    Complete joint exam was performed including the MCPs, PIPs, DIPs of the hands, wrists, elbows, shoulders, hips, knees and ankles.  No soft tissue swelling or tenderness is present except as above.    General: The patient is well-developed and well nourished. Cooperative, alert and oriented. Affect is normal. Hydration appears normal.   HEENT: Normocephalic and atraumatic. Lids and conjunctiva are normal. Pupils are equal and sclera are clear. Oropharynx is clear   NECK neck is supple without adenopathy, masses or thyromegaly.   CARDIOVASCULAR: Regular " "rate and rhythm. No murmurs, rubs or gallops   LUNGS: Effort is normal. Lungs are clear bilateral   ABDOMEN: Not examined  EXTREMITIES: Peripheral pulses are intact. No clubbing.   SKIN: No rashes. No subcutaneous nodules. No digital ulcers. No sclerodactyly.   NEUROLOGIC: Gait is normal. Strength testing is normal.  No focal neurologic deficits    Results Review:   Labs:   Lab Results   Component Value Date    BUN 17 01/30/2025    CREATININE 1.2 (H) 01/30/2025    BCR 14 01/30/2025    K 4.3 01/30/2025    CO2 27 01/30/2025    CALCIUM 8.8 01/30/2025    ALBUMIN 3.9 01/30/2025    BILITOT 0.8 01/30/2025    AST 32 01/30/2025    ALT 22 01/30/2025     Lab Results   Component Value Date    WBC 6.4 01/30/2025    HGB 11.9 (L) 01/30/2025    HCT 37.7 01/30/2025    MCV 90.2 01/30/2025     01/30/2025     Lab Results   Component Value Date    SEDRATE 55 (H) 12/04/2019     Lab Results   Component Value Date    CRP <0.29 12/04/2019     No results found for: \"QUANTIFERO\", \"QUANTITB1\", \"QUANTITB2\", \"QUANTIFERN\", \"QUANTIFERM\", \"QUANTITBGLDP\"  No results found for: \"RF\"  No results found for: \"HEPBSAG\", \"HEPAIGM\", \"HEPBIGMCORE\", \"HEPCVIRUSABY\"      Procedures    Assessment / Plan        -Rheumatoid arthritis involving multiple sites with positive rheumatoid factor  dx 2009 dr morales; +; CCP neg; former LPN [facility]  + Erosions hand x-rays    **Current rx: Leflunomide 20mg start 9/18, methotrexate 7.5mg 2010-18; restart 1/23, duloxetine   *Avoid oral NSAIDs with anticoagulation/renal insufficiency.  Rash with sulfasalazine/sulfa allergic  prior plaquenil [id number] (stopped [facility] 8.15)  prior xeljanz 11.15 -stopped with meningitis August 2018  Told by ID never to take biologics again  Hand films 9/2023 with mild-moderate OA and no erosions seen.        Prior Methotrexate and Xeljanz stopped in August 2018 due to episode of meningitis requiring hospitalization.    Her ID doctor recommend " she never take biologics/JAKs again  Intolerant sulfasalazine due to rash/sulfa allergy. Remain off sulfasalazine.     Some hand and foot pain likely related to OA- hand films 10/7/2024 with OA changes and erosive changes  -Foot x-rays 10/7/2024 showed multifocal degenerative changes, no erosions  -Avoids oral NSAIDs with anticoagulation and renal insufficiency.       Low disease activity from RA.  Swollen joint count 0 tender joint count 0 Good prognosis  -Continue methotrexate to 12.5 mg qw  -leflunomide 20mg qd.   - duloxetine, topical diclofenac, turmeric for OA pain  -No oral NSAIDs with CKD  -Will avoid further Biologics/JAKis for RA given her history of meningitis August 2018  -Continue labs CBC CMP every 8 weeks for monitoring. Standing order provided.    -Labs 1/30/2025 reviewed and stable.  Inflammatory markers normal  rtc 4 months     -Immunosuppression due to drug therapy  Methotrexate and leflunomide     Well tolerated and effective  We have discussed the side effects of leflunomide including but not limited to rash, GI upset, hematologic and liver abnormalities     Risks of methotrexate include but are not limited to severe liver damage that can be fatal, the possible need for liver biopsy, bone marrow suppression that can lead to dangerously low blood counts, GI side effects including mouth sores and diarrhea, fatigue, and rare risk of severe pulmonary complications. There should be no alcohol consumed with MTX. MTX can cause severe fetal abnormalities whether the mother or father is taking the medication and thus must be avoided if pregnancy is a possibility.  All medication is to be taken one day a week only. The need for q 8-12 week labs and the need for folic acid supplementation were discussed     -High risk medication use     -Generalized osteoarthrosis  2019 XRs severe PF OA  2023 XRs with mild degenerative change.     Avoids oral NSAIDs with anticoagulation/history of GI bleed, renal  insufficiency  Some OA pain hands and feet   Can schedule injections PRN.   -diclofenac gel   Recommend duloxetine   -Can try turmeric OTC     -Osteopenia   DEXA [facility] daughter 12/27/2024: Osteopenia with elevated FRAX score hip 4%  Prior Fosamax  History of radiation for breast cancer     Continue calcium vitamin D and weightbearing exercise  Monitoring DEXA scan with PCP at [facility]  -Add alendronate 35 mg once weekly in light of elevated FRAX score with CKD 3 on labs  Risk benefits of bisphosphonates/prolia discussed in detail including but not limited to osteonecrosis jaw, atypical femoral fracture, bone death, kidney failure, hypocalcemia. Patient has no reported jaw or tooth pain.     -Personal history of meningitis  August 2018 requiring hospitalization.   Previous Infectious disease in [facility] Dr. Alexis Vela     History of 2018. She's recovered well from this.  No recurrence  Still some memory deficits but overall doing much better     -Renal insufficiency   Avoids oral NSAIDs    -diabetes mellitus      -History of breast cancer  hx of; sp radiation 2001.     -Gastritis   GI in [place] Dr. Cortes      1. Rheumatoid arthritis involving multiple sites with positive rheumatoid factor    2. High risk medication use            Orders Placed This Encounter   Procedures    Comprehensive Metabolic Panel    CBC Auto Differential    C-reactive Protein    Sedimentation Rate     New Medications Ordered This Visit   Medications    leflunomide (ARAVA) 20 MG tablet     Sig: Take 1 tablet by mouth Daily.     Dispense:  90 tablet     Refill:  0    methotrexate 2.5 MG tablet     Sig: Take 5 tablets by mouth 1 (One) Time Per Week.     Dispense:  60 tablet     Refill:  0    alendronate (FOSAMAX) 35 MG tablet     Sig: Take 1 tablet by mouth Every 7 (Seven) Days.     Dispense:  12 tablet     Refill:  3       Follow Up:   Return in about 4 months (around 6/7/2025).      Portions of this note have been  copied forward.  I have reviewed and updated the patient's chief complaint, history of present illness, review of systems, past medical history, surgical history, family history, social history, medications and allergy list, physical exam, assessment and plan as appropriate.     Kamlesh Washburn MD  [facility]

## 2025-02-07 NOTE — PATIENT INSTRUCTIONS
"Osteoarthritis    Osteoarthritis is a type of arthritis. It refers to joint pain or joint disease. Osteoarthritis affects tissue that covers the ends of bones in joints (cartilage). Cartilage acts as a cushion between the bones and helps them move smoothly. Osteoarthritis occurs when cartilage in the joints gets worn down. Osteoarthritis is sometimes called \"wear and tear\" arthritis.  Osteoarthritis is the most common form of arthritis. It often occurs in older people. It is a condition that gets worse over time. The joints most often affected by this condition are in the fingers, toes, hips, knees, and spine, including the neck and lower back.    What are the causes?  This condition is caused by the wearing down of cartilage that covers the ends of bones.    What increases the risk?  The following factors may make you more likely to develop this condition:  Being age 50 or older.  Obesity.  Overuse of joints.  Past injury of a joint.  Past surgery on a joint.  Family history of osteoarthritis.    What are the signs or symptoms?  The main symptoms of this condition are pain, swelling, and stiffness in the joint. Other symptoms may include:  An enlarged joint.  More pain and further damage caused by small pieces of bone or cartilage that break off and float inside of the joint.  Small deposits of bone (osteophytes) that grow on the edges of the joint.  A grating or scraping feeling inside the joint when you move it.  Popping or creaking sounds when you move.  Difficulty walking or exercising.  An inability to  items, twist your hand, or control the movements of your hands and fingers.    How is this diagnosed?  This condition may be diagnosed based on:  Your medical history.  A physical exam.  Your symptoms.  X-rays of the affected joints.  Blood tests to rule out other types of arthritis.    How is this treated?  There is no cure for this condition, but treatment can help control pain and improve joint function. " Treatment may include a combination of therapies, such as:  Pain relief techniques, such as:  Applying heat and cold to the joint.  Massage.  A form of talk therapy called cognitive behavioral therapy (CBT). This therapy helps you set goals and follow up on the changes that you make.  Medicines for pain and inflammation. The medicines can be taken by mouth or applied to the skin. They include:  NSAIDs, such as ibuprofen.  Prescription medicines.  Strong anti-inflammatory medicines (corticosteroids).  Certain nutritional supplements.  A prescribed exercise program. You may work with a physical therapist.  Assistive devices, such as a brace, wrap, splint, specialized glove, or cane.  A weight control plan.  Surgery, such as:  An osteotomy. This is done to reposition the bones and relieve pain or to remove loose pieces of bone and cartilage.  Joint replacement surgery. You may need this surgery if you have advanced osteoarthritis.    Follow these instructions at home:    Activity  Rest your affected joints as told by your health care provider.  Exercise as told by your provider. The provider may recommend specific types of exercise, such as:  Strengthening exercises. These are done to strengthen the muscles that support joints affected by arthritis.  Aerobic activities. These are exercises, such as brisk walking or water aerobics, that increase your heart rate.  Range-of-motion activities. These help your joints move more easily.  Balance and agility exercises.    Managing pain, stiffness, and swelling         If told, apply heat to the affected area as often as told by your provider. Use the heat source that your provider recommends, such as a moist heat pack or a heating pad.  If you have a removable assistive device, remove it as told by your provider.  Place a towel between your skin and the heat source. If your provider tells you to keep the assistive device on while you apply heat, place a towel between the  assistive device and the heat source.  Leave the heat on for 20-30 minutes.  If told, put ice on the affected area.  If you have a removable assistive device, remove it as told by your provider.  Put ice in a plastic bag.  Place a towel between your skin and the bag. If your provider tells you to keep the assistive device on during icing, place a towel between the assistive device and the bag.  Leave the ice on for 20 minutes, 2-3 times a day.  If your skin turns bright red, remove the ice or heat right away to prevent skin damage. The risk of damage is higher if you cannot feel pain, heat, or cold.  Move your fingers or toes often to reduce stiffness and swelling.  Raise (elevate) the affected area above the level of your heart while you are sitting or lying down.    General instructions  Take over-the-counter and prescription medicines only as told by your provider.  Maintain a healthy weight. Follow instructions from your provider for weight control.  Do not use any products that contain nicotine or tobacco. These products include cigarettes, chewing tobacco, and vaping devices, such as e-cigarettes. If you need help quitting, ask your provider.  Use assistive devices as told by your provider.    Where to find more information  National [place] of Arthritis and Musculoskeletal and Skin Diseases: niams.nih.gov  National [place] on Aging: tim.nih.gov  American College of Rheumatology: rheumatology.org    Contact a health care provider if:  You have redness, swelling, or a feeling of warmth in a joint that gets worse.  You have a fever along with joint or muscle aches.  You develop a rash.  You have trouble doing your normal activities.  You have pain that gets worse and is not relieved by pain medicine.    This information is not intended to replace advice given to you by your health care provider. Make sure you discuss any questions you have with your health care provider.  Document Revised: 08/17/2023  Document Reviewed: 08/17/2023  [facility] Patient Education © 2024 [facility] Rheumatoid Arthritis    Rheumatoid arthritis (RA) is a long-term (chronic) disease that causes inflammation in the joints. RA may start slowly. It most often affects the small joints of the hands and feet. Usually, the same joints are affected on both sides of the body. Inflammation from RA can also affect other parts of the body, including the heart, eyes, or lungs.  There is no cure for RA, but medicines can help your symptoms and stop or slow down the progression of the disease.    What are the causes?  RA is an autoimmune disease. When you have an autoimmune disease, your body's defense system (immune system) mistakenly attacks healthy body tissues. The exact cause of RA is not known.    What increases the risk?  The following factors may make you more likely to develop this condition:  Being female.  Having a family history of RA or other autoimmune diseases.  Having a history of smoking.  Being obese.  Having been exposed to pollutants or chemicals.    What are the signs or symptoms?  Symptoms of this condition usually start gradually. They are often worse in the morning. The first symptom may be morning stiffness that lasts longer than 30 minutes.    As RA progresses, symptoms may include:  Pain, stiffness, swelling, warmth, and tenderness in joints on both sides of your body.  Loss of energy.  Loss of appetite.  Weight loss.  Low-grade fever.  Dry eyes and dry mouth.  Firm lumps (rheumatoid nodules) that grow beneath your skin in areas such as your forearm bones near your elbows and on your hands.  Changes in the appearance of joints (deformity) and loss of joint function.    Symptoms of this condition vary from person to person.  Symptoms of RA often come and go.  Sometimes, symptoms get worse for a period of time. These are called flares.    How is this diagnosed?  This condition is diagnosed based on your symptoms, medical  "history, and a physical exam. You may have X-rays or an MRI to check for the type of joint changes that are caused by RA.  You may also have blood tests to look for:  Proteins (antibodies) that your immune system may make if you have RA. These include rheumatoid factor (RF) and anti-CCP.  When blood tests show these proteins, you are said to have \"seropositive RA.\"  When blood tests do not show these proteins, you may have \"seronegative RA.\"  Inflammation in your blood.  A low number of red blood cells (anemia).    How is this treated?    The goals of treatment are to relieve pain, reduce inflammation, and slow down or stop joint damage and disability. Treatment may include:  Lifestyle changes. It is important to rest as needed, eat a healthy diet, and exercise.  Medicines. Your health care provider may adjust your medicines every 3 months until treatment goals are reached. Common medicines include:  Pain relievers (analgesics).  Corticosteroids and NSAIDs, such as ibuprofen, to reduce inflammation.  Disease-modifying antirheumatic drugs (DMARDs) to try to slow the course of the disease.  Biologic response modifiers to reduce inflammation and damage.  Physical therapy and occupational therapy.  Surgery, if you have severe joint damage. Joint replacement or fusing of joints may be needed.  Your health care provider will work with you to identify the best treatment option for you based on assessment of the overall disease activity in your body.    Follow these instructions at home:    Managing pain, stiffness, and swelling    If directed, apply heat to the affected area as often as told by your health care provider. Use the heat source that your health care provider recommends, such as a moist heat pack or a heating pad.  Place a towel between your skin and the heat source.  Leave the heat on for 20-30 minutes.  Remove the heat if your skin turns bright red. This is especially important if you are unable to feel pain, " heat, or cold. You have a greater risk of getting burned.    Activity  Return to your normal activities as told by your health care provider. Ask your health care provider what activities are safe for you.  Rest when you are having a flare.  Start an exercise program as told by your health care provider. This may include physical therapy exercises to maintain movement and strength in your joints.    General instructions  Take over-the-counter and prescription medicines only as told by your health care provider.  Keep all follow-up visits. This is important.    Where to find more information  American College of Rheumatology: rheumatology.org  Arthritis Foundation: arthritis.org    Contact a health care provider if:  You have a flare-up of RA symptoms.  You have a fever.  You have side effects from your medicines.    Get help right away if:  You have chest pain.  You have trouble breathing.  You quickly develop a hot, painful joint that is more severe than your usual joint aches.  These symptoms may be an emergency. Get help right away. Call 911.  Do not wait to see if the symptoms will go away.  Do not drive yourself to the hospital.    Summary  Rheumatoid arthritis (RA) is a long-term (chronic) disease that causes inflammation in the joints.  RA is an autoimmune disease.  The goals of treatment are to relieve pain, reduce inflammation, and slow down or stop joint damage and disability.    This information is not intended to replace advice given to you by your health care provider. Make sure you discuss any questions you have with your health care provider.  Document Revised: 10/20/2022 Document Reviewed: 10/20/2022  [employer] Patient Education © 2024 [employer]

## 2025-05-12 RX ORDER — DULOXETIN HYDROCHLORIDE 30 MG/1
30 CAPSULE, DELAYED RELEASE ORAL DAILY
Qty: 90 CAPSULE | Refills: 1 | Status: SHIPPED | OUTPATIENT
Start: 2025-05-12

## 2025-05-12 NOTE — TELEPHONE ENCOUNTER
Received refill request for duloxetine 30 mg but the last rx sent in was for 60 mg. It looks like the patient has been filling 30. Please review and send refill for appropriate dose.

## 2025-06-09 DIAGNOSIS — M05.79 RHEUMATOID ARTHRITIS INVOLVING MULTIPLE SITES WITH POSITIVE RHEUMATOID FACTOR: ICD-10-CM

## 2025-06-09 RX ORDER — LEFLUNOMIDE 20 MG/1
20 TABLET ORAL DAILY
Qty: 90 TABLET | Refills: 0 | Status: SHIPPED | OUTPATIENT
Start: 2025-06-09

## 2025-06-23 ENCOUNTER — RESULTS FOLLOW-UP (OUTPATIENT)
Age: 80
End: 2025-06-23
Payer: MEDICARE

## 2025-06-25 ENCOUNTER — OFFICE VISIT (OUTPATIENT)
Age: 80
End: 2025-06-25
Payer: MEDICARE

## 2025-06-25 VITALS
SYSTOLIC BLOOD PRESSURE: 130 MMHG | HEART RATE: 61 BPM | WEIGHT: 150.1 LBS | DIASTOLIC BLOOD PRESSURE: 82 MMHG | BODY MASS INDEX: 30.26 KG/M2 | HEIGHT: 59 IN | TEMPERATURE: 97.1 F

## 2025-06-25 DIAGNOSIS — M05.79 RHEUMATOID ARTHRITIS INVOLVING MULTIPLE SITES WITH POSITIVE RHEUMATOID FACTOR: Primary | ICD-10-CM

## 2025-06-25 DIAGNOSIS — D84.821 IMMUNOSUPPRESSION DUE TO DRUG THERAPY: ICD-10-CM

## 2025-06-25 DIAGNOSIS — Z79.899 IMMUNOSUPPRESSION DUE TO DRUG THERAPY: ICD-10-CM

## 2025-06-25 DIAGNOSIS — Z79.899 HIGH RISK MEDICATION USE: ICD-10-CM

## 2025-06-25 RX ORDER — METHOTREXATE 2.5 MG/1
12.5 TABLET ORAL WEEKLY
Qty: 60 TABLET | Refills: 0 | Status: SHIPPED | OUTPATIENT
Start: 2025-06-25

## 2025-06-25 RX ORDER — LEFLUNOMIDE 20 MG/1
20 TABLET ORAL DAILY
Qty: 90 TABLET | Refills: 0 | Status: SHIPPED | OUTPATIENT
Start: 2025-06-25

## 2025-06-25 NOTE — PROGRESS NOTES
Office Follow Up      Date: 06/25/2025   Patient Name: Clarissa Gutierres V  MRN: [id number]  YOB: 1945    Referring Physician: No ref. provider found     Chief Complaint:   Chief Complaint   Patient presents with    Rheumatoid Arthritis       History of Present Illness: Clarissa Gutierres V is a 79 y.o. female who is here today for follow up on rheumatoid arthritis.     She is tolerating leflunomide and methotrexate well    Rheumatoid is overall improved.  No joint swelling today.  No RA flares.  Less fatigue  Increased aching in the right great toe for the past few weeks with some slight discoloration.  Topical diclofenac helpful     Continues with intermittent knee pain. 9/2023 XR with mild degenerative changes.   She avoids oral NSAIDS due to anticoagulation and gastritis and renal insufficiency.   She continues on duloxetine which is helpful for pain  Previously intolerant to sulfasalazine due to rash/sulfa allergy   No recent serious infections.  Hand films 9/2023 with mild to moderate OA. No erosions seen.     Patient had episode of meningitis August 2018 requiring hospitalization.  She was advised to stop    Xeljanz and never take José Antonio inhibitor again by her infectious disease Dr. Chang and Dr Alexis Justice ([facility] in [place])     She was hospitalized in [place] in late April 2023 due to gastritis and low hemoglobin 7. She had EGD with biopsy results showing gastritis.  No bleeding ulcer found.  She was placed on Protonix by her gastroenterologist Dr. Cortes.     History of Present Illness        Subjective       Review of Systems: Review of Systems   Constitutional:  Positive for activity change. Negative for chills, fatigue, fever and unexpected weight loss.   HENT:  Negative for mouth sores, sinus pressure and sore throat.    Eyes:  Negative for pain and redness.   Respiratory:  Positive for choking. Negative for cough and shortness of breath.     Cardiovascular:  Negative for chest pain.   Gastrointestinal:  Positive for abdominal pain, constipation, diarrhea, nausea, vomiting, GERD and indigestion. Negative for blood in stool.   Endocrine: Positive for heat intolerance. Negative for polydipsia and polyuria.   Genitourinary:  Negative for dysuria, genital sores and hematuria.   Musculoskeletal:  Positive for back pain, neck pain and neck stiffness. Negative for arthralgias, joint swelling and myalgias.   Skin:  Negative for rash and bruise.   Neurological:  Negative for seizures, weakness, numbness and memory problem.   Hematological:  Negative for adenopathy. Does not bruise/bleed easily.   Psychiatric/Behavioral:  Negative for depressed mood. The patient is not nervous/anxious.         Past Medical History: History reviewed. No pertinent past medical history.    Past Surgical History: History reviewed. No pertinent surgical history.    Family History: History reviewed. No pertinent family history.    Social History:   Social History     Socioeconomic History    Marital status:    Tobacco Use    Smoking status: Never    Smokeless tobacco: Never   Vaping Use    Vaping status: Never Used   Substance and Sexual Activity    Alcohol use: Never    Drug use: Never    Sexual activity: Defer       Medications:   Current Outpatient Medications:     alendronate (FOSAMAX) 35 MG tablet, Take 1 tablet by mouth Every 7 (Seven) Days., Disp: 12 tablet, Rfl: 3    atorvastatin (LIPITOR) 40 MG tablet, Take 1 tablet by mouth Daily., Disp: , Rfl:     baclofen (LIORESAL) 10 MG tablet, Take 1 tablet by mouth 3 (Three) Times a Day., Disp: , Rfl:     Cholecalciferol (Vitamin D3) 50 MCG capsule, , Disp: , Rfl:     cycloSPORINE (RESTASIS) 0.05 % ophthalmic emulsion, , Disp: , Rfl:     DULoxetine (CYMBALTA) 30 MG capsule, TAKE 1 CAPSULE DAILY, Disp: 90 capsule, Rfl: 1    empagliflozin (JARDIANCE) 10 MG tablet tablet, Take 1 tablet by mouth Every Morning., Disp: , Rfl:      "fluticasone (FLONASE) 50 MCG/ACT nasal spray, 2 sprays by Each Nare route Daily. 50mcg, Disp: , Rfl:     folic acid (FOLVITE) 1 MG tablet, Take 1 tablet by mouth Daily., Disp: 90 tablet, Rfl: 3    furosemide (LASIX) 20 MG tablet, Take 1 tablet by mouth Daily., Disp: , Rfl:     insulin glargine (LANTUS, SEMGLEE) 100 UNIT/ML injection, Inject by subcutaneous route as per insulin protocol, Disp: , Rfl:     Insulin Lispro (humaLOG) 100 UNIT/ML injection, , Disp: , Rfl:     Insulin Syringe-Needle U-100 (BD Insulin Syringe U/F) 31G X 5/16\" 1 ML misc, Use., Disp: , Rfl:     ipratropium (ATROVENT) 0.03 % nasal spray, , Disp: , Rfl:     leflunomide (ARAVA) 20 MG tablet, Take 1 tablet by mouth Daily., Disp: 90 tablet, Rfl: 0    levalbuterol (XOPENEX HFA) 45 MCG/ACT inhaler, Inhale 2 puffs Every 4 (Four) Hours., Disp: , Rfl:     methotrexate 2.5 MG tablet, Take 5 tablets by mouth 1 (One) Time Per Week., Disp: 60 tablet, Rfl: 0    metoprolol succinate XL (TOPROL-XL) 100 MG 24 hr tablet, Take 1 tablet by mouth Daily., Disp: , Rfl:     olmesartan (BENICAR) 20 MG tablet, Take 1 tablet by mouth Daily., Disp: , Rfl:     rivaroxaban (XARELTO) 15 MG tablet, , Disp: , Rfl:     Turmeric 5-1000 MG capsule, Take 1 capsule by mouth Daily., Disp: , Rfl:     Diclofenac Sodium (VOLTAREN) 1 % gel gel, Apply 2-4 grams by topical route 3-4 times every day to the affected area(s) PRN pain, Disp: 100 g, Rfl: 5    Allergies:   Allergies   Allergen Reactions    Sulfa Antibiotics Rash           Objective        Vital Signs:   Vitals:    06/25/25 1006   BP: 130/82   BP Location: Left arm   Patient Position: Sitting   Cuff Size: Adult   Pulse: 61   Temp: 97.1 °F (36.2 °C)   Weight: 68.1 kg (150 lb 1.6 oz)   Height: 149.9 cm (59\")   PainSc: 4      Body mass index is 30.32 kg/m².      Physical Exam:  Physical Exam   MUSCULOSKELETAL:   No peripheral synovitis  Heberden and Tegan's nodes present throughout the hands  Right great toe joint with bony " "enlargement/OA change.  No synovitis.  Good capillary refill       Complete joint exam was performed including the MCPs, PIPs, DIPs of the hands, wrists, elbows, shoulders, hips, knees and ankles.  No soft tissue swelling or tenderness is present except as above.    General: The patient is well-developed and well nourished. Cooperative, alert and oriented. Affect is normal. Hydration appears normal.   HEENT: Normocephalic and atraumatic. Lids and conjunctiva are normal. Pupils are equal and sclera are clear. Oropharynx is clear   NECK neck is supple without adenopathy, masses or thyromegaly.   CARDIOVASCULAR: Regular rate and rhythm. No murmurs, rubs or gallops   LUNGS: Effort is normal. Lungs are clear bilateral   ABDOMEN: Not examined  EXTREMITIES: Peripheral pulses are intact. No clubbing.   SKIN: No rashes. No subcutaneous nodules. No digital ulcers. No sclerodactyly.   NEUROLOGIC: Gait is normal. Strength testing is normal.  No focal neurologic deficits    Results Review:   Labs:   Lab Results   Component Value Date    BUN 27 06/20/2025    CREATININE 1.4 (H) 06/20/2025    BCR 19 06/20/2025    K 4.3 06/20/2025    CO2 22 06/20/2025    CALCIUM 8.8 06/20/2025    ALBUMIN 4.0 06/20/2025    BILITOT 0.6 06/20/2025    AST 31 06/20/2025    ALT 24 06/20/2025     Lab Results   Component Value Date    WBC 6.9 06/20/2025    HGB 12.1 06/20/2025    HCT 37.0 06/20/2025    MCV 86.5 06/20/2025     06/20/2025     Lab Results   Component Value Date    SEDRATE 55 (H) 12/04/2019     Lab Results   Component Value Date    CRP <0.29 12/04/2019     No results found for: \"QUANTIFERO\", \"QUANTITB1\", \"QUANTITB2\", \"QUANTIFERN\", \"QUANTIFERM\", \"QUANTITBGLDP\"  No results found for: \"RF\"  No results found for: \"HEPBSAG\", \"HEPAIGM\", \"HEPBIGMCORE\", \"HEPCVIRUSABY\"      Procedures    Assessment / Plan      -Rheumatoid arthritis involving multiple sites with positive rheumatoid factor  dx 2009 dr morales; +; 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  Rash with sulfasalazine/sulfa allergic  prior plaquenil [id number] (stopped [facility] 8.15)  prior xeljanz 11.15 -stopped with meningitis August 2018  Intolerant sulfasalazine due to rash/sulfa allergy.   Told by ID never to take biologics/José Antonio again  -Hand films 10/7/24 with mild-moderate OA and no erosions seen.  -Foot x-rays 10/7/2024 showed multifocal degenerative changes, no erosions  -Avoids oral NSAIDs with anticoagulation and renal insufficiency.         Low disease activity from RA.  Swollen joint count 0 tender joint count 0 Good prognosis  -Continue methotrexate to 12.5 mg qweek  -leflunomide 20mg qd.   - duloxetine, topical diclofenac, turmeric for OA pain knees, right great toe  -We discussed increasing diclofenac to 4 times daily as she is only been doing using it once daily on the great toe  I do think this is gout.  There is no synovitis to suggest gout in the right great toe  -No oral NSAIDs with CKD and chronic anticoagulation  -Will avoid further Biologics/JAKis for RA given her history of meningitis August 2018  -Continue labs CBC CMP every 12 weeks for monitoring. Standing order provided.  She does these [place] daughter  -Labs 6/2025 reviewed and stable.    - X-ray feet today with her complaints of right great toe pain for 4 weeks  -She will let us know if she ever wants to schedule steroid injection right great toe joint for OA  rtc 4 months     -Immunosuppression due to drug therapy  Methotrexate and leflunomide     Well tolerated and effective  We have discussed the side effects of leflunomide including but not limited to rash, GI upset, hematologic and liver abnormalities     Risks of methotrexate include but are not limited to severe liver damage that can be fatal, the  possible need for liver biopsy, bone marrow suppression that can lead to dangerously low blood counts, GI side effects including mouth sores and diarrhea, fatigue, and rare risk of severe pulmonary complications. There should be no alcohol consumed with MTX. MTX can cause severe fetal abnormalities whether the mother or father is taking the medication and thus must be avoided if pregnancy is a possibility.  All medication is to be taken one day a week only. The need for q 8-12 week labs and the need for folic acid supplementation were discussed     -High risk medication use     -Generalized osteoarthrosis  2019 XRs severe PF OA  2023 XRs with mild degenerative change.     Avoids oral NSAIDs with anticoagulation/history of GI bleed, renal insufficiency  Some OA pain hands and feet, knees  Can schedule injections PRN.   -diclofenac gel up to 4 times daily  -duloxetine   -turmeric OTC     -Osteopenia   DEXA [place] daughter 12/27/2024: Osteopenia with elevated FRAX score hip 4%  Prior Fosamax  History of radiation for breast cancer  Current Rx: Alendronate 35 mg once weekly (2/2025)     Continue calcium vitamin D and weightbearing exercise  Monitoring DEXA scan with PCP at [place] [facility]  - Continue alendronate 35 mg once weekly in light of elevated FRAX score with CKD 3 on labs  Risk benefits of bisphosphonates/prolia discussed in detail including but not limited to osteonecrosis jaw, atypical femoral fracture, bone death, kidney failure, hypocalcemia. Patient has no reported jaw or tooth pain.     -Personal history of meningitis  August 2018 requiring hospitalization.   Previous Infectious disease in [facility] Dr. Alexis eVla     History of 2018. She's recovered well from this.  No recurrence  Still some memory deficits but overall doing much better     -Renal insufficiency   Avoids oral NSAIDs     -diabetes mellitus      -History of breast cancer  hx of; sp radiation 2001.     -Gastritis   GI in [place]  Dr. Cortes      1. Rheumatoid arthritis involving multiple sites with positive rheumatoid factor    2. High risk medication use    3. Immunosuppression due to drug therapy        Assessment & Plan        Orders Placed This Encounter   Procedures    XR Foot 3+ View Bilateral    Comprehensive Metabolic Panel    CBC Auto Differential    C-reactive Protein    Sedimentation Rate     New Medications Ordered This Visit   Medications    leflunomide (ARAVA) 20 MG tablet     Sig: Take 1 tablet by mouth Daily.     Dispense:  90 tablet     Refill:  0    methotrexate 2.5 MG tablet     Sig: Take 5 tablets by mouth 1 (One) Time Per Week.     Dispense:  60 tablet     Refill:  0    Diclofenac Sodium (VOLTAREN) 1 % gel gel     Sig: Apply 2-4 grams by topical route 3-4 times every day to the affected area(s) PRN pain     Dispense:  100 g     Refill:  5       Follow Up:   Return in about 4 months (around 10/25/2025).      Discussed plan of care in detail with the patient today.  Patient verbalized understanding and agrees.    I confirm accuracy of unchanged data/findings which have been carried forward from previous visit.  I have updated appropriately those that have changed.        Kamlesh Washburn MD  [facility]

## 2025-06-25 NOTE — PATIENT INSTRUCTIONS
Rheumatoid Arthritis    Rheumatoid arthritis (RA) is a long-term (chronic) disease that causes inflammation in the joints. RA may start slowly. It most often affects the small joints of the hands and feet. Usually, the same joints are affected on both sides of the body. Inflammation from RA can also affect other parts of the body, including the heart, eyes, or lungs.  There is no cure for RA, but medicines can help your symptoms and stop or slow down the progression of the disease.    What are the causes?  RA is an autoimmune disease. When you have an autoimmune disease, your body's defense system (immune system) mistakenly attacks healthy body tissues. The exact cause of RA is not known.    What increases the risk?  The following factors may make you more likely to develop this condition:  Being female.  Having a family history of RA or other autoimmune diseases.  Having a history of smoking.  Being obese.  Having been exposed to pollutants or chemicals.    What are the signs or symptoms?  Symptoms of this condition usually start gradually. They are often worse in the morning. The first symptom may be morning stiffness that lasts longer than 30 minutes.    As RA progresses, symptoms may include:  Pain, stiffness, swelling, warmth, and tenderness in joints on both sides of your body.  Loss of energy.  Loss of appetite.  Weight loss.  Low-grade fever.  Dry eyes and dry mouth.  Firm lumps (rheumatoid nodules) that grow beneath your skin in areas such as your forearm bones near your elbows and on your hands.  Changes in the appearance of joints (deformity) and loss of joint function.    Symptoms of this condition vary from person to person.  Symptoms of RA often come and go.  Sometimes, symptoms get worse for a period of time. These are called flares.    How is this diagnosed?  This condition is diagnosed based on your symptoms, medical history, and a physical exam. You may have X-rays or an MRI to check for the type  "of joint changes that are caused by RA.  You may also have blood tests to look for:  Proteins (antibodies) that your immune system may make if you have RA. These include rheumatoid factor (RF) and anti-CCP.  When blood tests show these proteins, you are said to have \"seropositive RA.\"  When blood tests do not show these proteins, you may have \"seronegative RA.\"  Inflammation in your blood.  A low number of red blood cells (anemia).    How is this treated?    The goals of treatment are to relieve pain, reduce inflammation, and slow down or stop joint damage and disability. Treatment may include:  Lifestyle changes. It is important to rest as needed, eat a healthy diet, and exercise.  Medicines. Your health care provider may adjust your medicines every 3 months until treatment goals are reached. Common medicines include:  Pain relievers (analgesics).  Corticosteroids and NSAIDs, such as ibuprofen, to reduce inflammation.  Disease-modifying antirheumatic drugs (DMARDs) to try to slow the course of the disease.  Biologic response modifiers to reduce inflammation and damage.  Physical therapy and occupational therapy.  Surgery, if you have severe joint damage. Joint replacement or fusing of joints may be needed.  Your health care provider will work with you to identify the best treatment option for you based on assessment of the overall disease activity in your body.    Follow these instructions at home:    Managing pain, stiffness, and swelling    If directed, apply heat to the affected area as often as told by your health care provider. Use the heat source that your health care provider recommends, such as a moist heat pack or a heating pad.  Place a towel between your skin and the heat source.  Leave the heat on for 20-30 minutes.  Remove the heat if your skin turns bright red. This is especially important if you are unable to feel pain, heat, or cold. You have a greater risk of getting burned.    Activity  Return to " your normal activities as told by your health care provider. Ask your health care provider what activities are safe for you.  Rest when you are having a flare.  Start an exercise program as told by your health care provider. This may include physical therapy exercises to maintain movement and strength in your joints.    General instructions  Take over-the-counter and prescription medicines only as told by your health care provider.  Keep all follow-up visits. This is important.    Where to find more information  American College of Rheumatology: rheumatology.org  Arthritis Foundation: arthritis.org    Contact a health care provider if:  You have a flare-up of RA symptoms.  You have a fever.  You have side effects from your medicines.    Get help right away if:  You have chest pain.  You have trouble breathing.  You quickly develop a hot, painful joint that is more severe than your usual joint aches.  These symptoms may be an emergency. Get help right away. Call 911.  Do not wait to see if the symptoms will go away.  Do not drive yourself to the hospital.    Summary  Rheumatoid arthritis (RA) is a long-term (chronic) disease that causes inflammation in the joints.  RA is an autoimmune disease.  The goals of treatment are to relieve pain, reduce inflammation, and slow down or stop joint damage and disability.    This information is not intended to replace advice given to you by your health care provider. Make sure you discuss any questions you have with your health care provider.  Document Revised: 10/20/2022 Document Reviewed: 10/20/2022  [employer] Patient Education © 2024 [employer]

## 2025-07-01 ENCOUNTER — RESULTS FOLLOW-UP (OUTPATIENT)
Age: 80
End: 2025-07-01
Payer: MEDICARE